# Patient Record
Sex: MALE | Race: WHITE | Employment: FULL TIME | URBAN - METROPOLITAN AREA
[De-identification: names, ages, dates, MRNs, and addresses within clinical notes are randomized per-mention and may not be internally consistent; named-entity substitution may affect disease eponyms.]

---

## 2021-12-03 ENCOUNTER — HOSPITAL ENCOUNTER (OUTPATIENT)
Dept: PHYSICAL THERAPY | Age: 51
Discharge: HOME | End: 2021-12-03
Payer: COMMERCIAL

## 2021-12-03 PROCEDURE — 97110 THERAPEUTIC EXERCISES: CPT

## 2021-12-03 PROCEDURE — 97161 PT EVAL LOW COMPLEX 20 MIN: CPT

## 2021-12-03 NOTE — PROGRESS NOTES
[facility] PHYSICAL THERAPY AT [facility] [address] [place], [address] - Phone: [phone]  Fax: 03-03-10-81 / [address]  Patient Name: Reinaldo Witt : 1970   Medical   Diagnosis: Pain in left shoulder [M25.512] Treatment Diagnosis: L shoulder pain   Onset Date: 21     Referral Source: Monica Yang MD Start of Care [facility]): 12/3/2021   Prior Hospitalization: See medical history Provider #: [id number]   Prior Level of Function: Full use of L UE for ADLs/ recreational activity   Comorbidities: Arthritis, depression, asthma, LBP, BMI> 30, sleep dysfunction   Medications: Verified on Patient Summary List   The Plan of Care and following information is based on the information from the initial evaluation.   ================================================================  Assessment / johnson information: Reinaldo Witt is a 46 y.o.  yo male with Dx of Pain in left shoulder [M25.512]. He reports injuring the left shoulder when he fell on it 21. He initially saw his PCP, then had f/u at [facility]. He has been diagnosed with L rotator cuff tear. Observation: forward head and shoulders  ROM measures: L shoulder AROM: flex= 51, scap= 67, ER= 71, IR= 80 (all movements are painful),  PROM WFLs  MMT measures: flex, scap, ER = 2 to 2+/5, IR= WNL  Special tests: na  FOTO score= 38%.  ================================================================  Eval Complexity: History LOW Complexity : Zero comorbidities / personal factors that will impact the outcome / POC;  Examination  MEDIUM Complexity : 3 Standardized tests and measures addressing body structure, function, activity limitation and / or participation in recreation ; Presentation MEDIUM Complexity : Evolving with changing characteristics ;   Decision Making MEDIUM Complexity : FOTO score of 26-74; Overall Complexity LOW   Problem List: pain affecting function, decrease ROM, decrease strength, decrease ADL/ functional abilitiies and decrease activity tolerance   Treatment Plan may include any combination of the following: Therapeutic exercise, Therapeutic activities, Neuromuscular re-education, Physical agent/modality, Manual therapy and Patient education  Patient / Family readiness to learn indicated by: asking questions, trying to perform skills and interest  Persons(s) to be included in education: patient (P)  Barriers to Learning/Limitations: None  Measures taken, if barriers to learning:    Patient Goal (s): Avoid surgery   Patient self reported health status: poor  Rehabilitation Potential: fair     Short Term Goals: To be accomplished in  2  weeks:  1 Patient will report >= 25% improvement in symptoms with ADLs  2 Patient will be educated in appropriate ROM, stabilization, strengthening exercises. 3 Maintain full PROM to avoid further complication      Long Term Goals: To be accomplished in  4-6  weeks:  1 Patient to report >= 75% improvement in symptoms with ADLs. 2 Patient will be independent with finalized HEP/ self maintenance. 3 Increase FOTO score >= 67% to indicate improved function with use of L UE.  4 Restore full AROM for improved ADL participation. Frequency / Duration:   Patient to be seen  2  times per week for 4-6  weeks:  Patient / Caregiver education and instruction: self care, activity modification and exercises    Therapist Signature: Lorelei Malhotra PT Date: 78/2/8847   Certification Period:  Time: 9:26 AM   ===================================================================  I certify that the above Physical Therapy Services are being furnished while the patient is under my care. I agree with the treatment plan and certify that this therapy is necessary.     Physician Signature:        Date:       Time:        Nisha Alexandre MD  Please sign and return to [facility] [place] or you may fax the signed copy to [phone]. Thank you.

## 2021-12-03 NOTE — PROGRESS NOTES
PHYSICAL THERAPY - DAILY TREATMENT NOTE     Patient Name: Satnma Jones        Date: 12/3/2021  : 1970   YES Patient  Verified  Visit #:   1     Insurance: Payor: Nicolas Acosta / Plan: Chad CHARLES / Product Type: Commerical /      In time: 845 Out time: 653   Total Treatment Time: 50     Medicare/[facility] Time Tracking (below)   Total Timed Codes (min):   1:1 Treatment Time:       TREATMENT AREA =  Pain in left shoulder [M25.512]    SUBJECTIVE    Pain Level (on 0 to 10 scale):  -6  / 10   Medication Changes/New allergies or changes in medical history, any new surgeries or procedures?     NO    If yes, update Summary List   Subjective Functional Status/Changes:  []  No changes reported     See eval /POC         OBJECTIVE  Modalities Rationale:     decrease pain to improve patient's ability to return to PLOF      min [] Estim, type/location:                                      []  att     []  unatt     []  w/US     []  w/ice    []  w/heat    min []  Mechanical Traction: type/lbs                   []  pro   []  sup   []  int   []  cont    []  before manual    []  after manual    min []  Ultrasound, settings/location:      min []  Iontophoresis w/ dexamethasone, location:                                               []  take home patch       []  in clinic   10 min [x]  Ice     []  Heat    location/position:     min []  Vasopneumatic Device, press/temp:     min []  Other:    [x] Skin assessment post-treatment (if applicable):    [x]  intact    []  redness- no adverse reaction     []redness - adverse reaction:      15 min Therapeutic Exercise:  [x]  See flow sheet   Rationale:      increase ROM and increase strength to improve the patients ability to return to PLOF      min Manual Therapy: Technique:      [] S/DTM []IASTM []PROM [] Passive Stretching   []manual TPR    []Jt manipulation:Gr I [] II []  III [] IV[]  []REIL with manual OP  Treatment Area:     Rationale:      decrease pain, increase ROM, increase tissue extensibility and decrease trigger points to improve patient's ability to return to PLOF     min Neuromuscular Re-ed: [x]  See flow sheet   Rationale:      improve coordination, improve balance, increase proprioception and dec dizziness to improve the patients ability to return to PLOF        min Self Care:    Rationale:    increase ROM, increase strength and improve coordination to improve the patients ability to return to PLOF    Billed With/As:   [] TE   [] TA   [] Neuro   [] Self Care Patient Education: [x] Review HEP    [] Progressed/Changed HEP based on:   [] positioning   [] body mechanics   [] transfers   [] heat/ice application    [] other:        Other Objective/Functional Measures:    See eval/ POC     Post Treatment Pain Level (on 0 to 10) scale:   5-6  / 10     ASSESSMENT    X  See POC     PLAN    [x]  Upgrade activities as tolerated {YES) Continue plan of care   []  Discharge due to :    []  Other:      Therapist: Davie Simon PT    Date: 12/3/2021 Time: 9:25 AM   No future appointments.

## 2021-12-06 ENCOUNTER — APPOINTMENT (OUTPATIENT)
Dept: PHYSICAL THERAPY | Age: 51
End: 2021-12-06
Payer: COMMERCIAL

## 2021-12-06 ENCOUNTER — HOSPITAL ENCOUNTER (OUTPATIENT)
Dept: PHYSICAL THERAPY | Age: 51
Discharge: HOME | End: 2021-12-06
Payer: COMMERCIAL

## 2021-12-06 PROCEDURE — 97014 ELECTRIC STIMULATION THERAPY: CPT

## 2021-12-06 PROCEDURE — 97140 MANUAL THERAPY 1/> REGIONS: CPT

## 2021-12-06 PROCEDURE — 97110 THERAPEUTIC EXERCISES: CPT

## 2021-12-06 NOTE — PROGRESS NOTES
PHYSICAL THERAPY - DAILY TREATMENT NOTE     Patient Name: Teddy Hernandez        Date: 2021  : 1970   YES Patient  Verified  Visit #:  2   of     Insurance: Payor: Kitty Jorge / Plan: Ronaldo CHARLES / Product Type: Commerical /      In time: 9:00 am Out time: 9:49   Total Treatment Time: 49     Medicare/[facility] Time Tracking (below)   Total Timed Codes (min):  29 1:1 Treatment Time: 29     TREATMENT AREA =  Pain in left shoulder [M25.512]    SUBJECTIVE    Pain Level (on 0 to 10 scale): 7-8  / 10   Medication Changes/New allergies or changes in medical history, any new surgeries or procedures?     NO    If yes, update Summary List   Subjective Functional Status/Changes:  []  No changes reported       Functional improvements: -  Functional impairments: increased pain at night         OBJECTIVE  Modalities Rationale:     decrease edema, decrease inflammation, decrease pain and increase tissue extensibility to improve patient's ability to perform ADLs without pain  10 min [x] Estim, type/location: IFC left shoulder; semi reclined                                     []  att     [x]  unatt     []  w/US     [x]  w/ice    []  w/heat    min []  Mechanical Traction: type/lbs                   []  pro   []  sup   []  int   []  cont    []  before manual    []  after manual    min []  Ultrasound, settings/location:      min []  Iontophoresis w/ dexamethasone, location:                                               []  take home patch       []  in clinic   10 min []  Ice     [x]  Heat    location/position: MH-pre manual left shoulder    min []  Vasopneumatic Device, press/temp:    If using vaso (only need to measure limb vaso being performed on)      pre-treatment girth :       post-treatment girth :       measured at (landmark location) :      min []  Other:    [x] Skin assessment post-treatment (if applicable):    [x]  intact    []  redness- no adverse reaction                  []redness - adverse reaction:      14 min Therapeutic Exercise:  [x]  See flow sheet   Rationale:      increase ROM and increase strength to improve the patients ability to perform ADLs without pain         15 min Manual Therapy: Technique:      [] S/DTM []IASTM []PROM [] Passive Stretching   []manual TPR    []Jt manipulation:Gr I [] II []  III [] IV[] V[]  Treatment Area:     Rationale:      decrease pain, increase ROM and increase tissue extensibility to improve patient's ability to perform self care  The manual therapy interventions were performed at a separate and distinct time from the therapeutic activities interventions. Billed With/As:   [] TE   [] TA   [] Neuro   [] Self Care Patient Education: [x] Review HEP    [] Progressed/Changed HEP based on:   [] positioning   [] body mechanics   [] transfers   [] heat/ice application    [] other:        Other Objective/Functional Measures: Add IFC with ice to decrease pain and muscle tension  Modified HEP   Post Treatment Pain Level (on 0 to 10) scale:   5-6 / 10     ASSESSMENT    Assessment/Changes in Function:   VCs for proper exercise technique, pt education to perform exercises in tolerable ranges; sleeping positioning     []  See Progress Note/Recertification   Patient will continue to benefit from skilled PT services to modify and progress therapeutic interventions, address functional mobility deficits, address ROM deficits, address strength deficits, analyze and address soft tissue restrictions, analyze and cue movement patterns, analyze and modify body mechanics/ergonomics and instruct in home and community integration to attain remaining goals.       Progress toward goals / Updated goals:    Slow Progress to    [] STG    [] LTG  1 as shown by fair tolerance to HEP, increased pain levels at night       PLAN    [x]  Upgrade activities as tolerated YES Continue plan of care   []  Discharge due to :    []  Other:      Therapist: Megan Anthony PTA    Date: 12/6/2021 Time: 9:49 am     Future Appointments   Date Time Provider Glenroy Rey   12/9/2021  5:15 PM Indira Grijalva PTA [facility]   12/13/2021  5:00 PM Mellissa Pimentel, KATHRINE [facility]   12/16/2021  5:15 PM Indira Grijalva PTA [facility]

## 2021-12-09 ENCOUNTER — HOSPITAL ENCOUNTER (OUTPATIENT)
Dept: PHYSICAL THERAPY | Age: 51
Discharge: HOME | End: 2021-12-09
Payer: COMMERCIAL

## 2021-12-09 PROCEDURE — 97014 ELECTRIC STIMULATION THERAPY: CPT

## 2021-12-09 PROCEDURE — 97110 THERAPEUTIC EXERCISES: CPT

## 2021-12-09 PROCEDURE — 97140 MANUAL THERAPY 1/> REGIONS: CPT

## 2021-12-09 NOTE — PROGRESS NOTES
PHYSICAL THERAPY - DAILY TREATMENT NOTE     Patient Name: Jerica Gaines        Date: 2021  : 1970   YES Patient  Verified  Visit #: 3   of     Insurance: Payor: Briana Devine / Plan: Gail CHARLES / Product Type: Commerical /      In time: 4:59 pm Out time: 5:51   Total Treatment Time: 52     Medicare/[employer] Time Tracking (below)   Total Timed Codes (min):  - 1:1 Treatment Time: -     TREATMENT AREA =  Pain in left shoulder [M25.512]    SUBJECTIVE    Pain Level (on 0 to 10 scale): -6  / 10   Medication Changes/New allergies or changes in medical history, any new surgeries or procedures?     Yes     If yes, update Summary List   Subjective Functional Status/Changes:  []  No changes reported       Functional improvements: performing HEP daily  Functional impairments: constant dull ache,  increased pain at night         OBJECTIVE  Modalities Rationale:     decrease edema, decrease inflammation, decrease pain and increase tissue extensibility to improve patient's ability to perform ADLs without pain  10 min [x] Estim, type/location: IFC left shoulder; semi reclined                                     []  att     [x]  unatt     []  w/US     [x]  w/ice    []  w/heat    min []  Mechanical Traction: type/lbs                   []  pro   []  sup   []  int   []  cont    []  before manual    []  after manual    min []  Ultrasound, settings/location:      min []  Iontophoresis w/ dexamethasone, location:                                               []  take home patch       []  in clinic    min []  Ice     []  Heat    location/position:     min []  Vasopneumatic Device, press/temp:    If using vaso (only need to measure limb vaso being performed on)      pre-treatment girth :       post-treatment girth :       measured at (landmark location) :      min []  Other:    [x] Skin assessment post-treatment (if applicable):    [x]  intact    []  redness- no adverse reaction                  []redness - adverse reaction: 37 min Therapeutic Exercise:  [x]  See flow sheet   Rationale:      increase ROM and increase strength to improve the patients ability to perform ADLs without pain         15 min Manual Therapy: Technique:      [] S/DTM []IASTM []PROM [] Passive Stretching   []manual TPR    []Jt manipulation:Gr I [] II []  III [] IV[] V[]  Treatment Area:     Rationale:      decrease pain, increase ROM and increase tissue extensibility to improve patient's ability to perform self care  The manual therapy interventions were performed at a separate and distinct time from the therapeutic activities interventions. Billed With/As:   [] TE   [] TA   [] Neuro   [] Self Care Patient Education: [x] Review HEP    [] Progressed/Changed HEP based on:   [] positioning   [] body mechanics   [] transfers   [] heat/ice application    [] other:        Other Objective/Functional Measures:  Self AAROM: flex: 50 degrees; ER :35 degrees;    PROM flexion: ~ 75 degrees    Updated written HEP   Post Treatment Pain Level (on 0 to 10) scale:  4/ 10     ASSESSMENT    Assessment/Changes in Function:   Pt demonstrating improving self AAROM into flexion and with table stretches      []  See Progress Note/Recertification   Patient will continue to benefit from skilled PT services to modify and progress therapeutic interventions, address functional mobility deficits, address ROM deficits, address strength deficits, analyze and address soft tissue restrictions, analyze and cue movement patterns, analyze and modify body mechanics/ergonomics and instruct in home and community integration to attain remaining goals.       Progress toward goals / Updated goals:    Slow Progress to    [] STG    [] LTG  1 as shown by fair/good  tolerance to HEP, decreasing muscle guarding in PROM, increased pain levels at night       PLAN    [x]  Upgrade activities as tolerated YES Continue plan of care   []  Discharge due to :    []  Other:      Therapist: Madina Miller, PTA Date: 12/9/2021 Time: 5:51 pm     Future Appointments   Date Time Provider Glenroy Rey   12/9/2021  5:15 PM Siri Jones PTA [facility]   12/13/2021  5:00 PM Geovanni Mallory PT [facility]   12/16/2021  5:15 PM Siri Jones PTA [facility]

## 2021-12-13 ENCOUNTER — HOSPITAL ENCOUNTER (OUTPATIENT)
Dept: PHYSICAL THERAPY | Age: 51
Discharge: HOME | End: 2021-12-13
Payer: COMMERCIAL

## 2021-12-13 PROCEDURE — 97110 THERAPEUTIC EXERCISES: CPT

## 2021-12-13 PROCEDURE — 97140 MANUAL THERAPY 1/> REGIONS: CPT

## 2021-12-13 NOTE — PROGRESS NOTES
PHYSICAL THERAPY - DAILY TREATMENT NOTE     Patient Name: Arabella Diaz        Date: 2021  : 1970   YES Patient  Verified  Visit #:   4     Insurance: Payor: Garett Kothari / Plan: Angie CHARLES / Product Type: Commerical /      In time: 500 Out time: 550   Total Treatment Time: 50     Medicare/[facility] Time Tracking (below)   Total Timed Codes (min):   1:1 Treatment Time:       TREATMENT AREA =  Pain in left shoulder [M25.512]    SUBJECTIVE    Pain Level (on 0 to 10 scale):  -6  / 10   Medication Changes/New allergies or changes in medical history, any new surgeries or procedures?     NO    If yes, update Summary List   Subjective Functional Status/Changes:  []  No changes reported     Can't sleep at night, it's so painful         OBJECTIVE  Modalities Rationale:     decrease inflammation and decrease pain to improve patient's ability to perform ADLs with less pain      min [] Estim, type/location:                                      []  att     []  unatt     []  w/US     []  w/ice    []  w/heat    min []  Mechanical Traction: type/lbs                   []  pro   []  sup   []  int   []  cont    []  before manual    []  after manual    min []  Ultrasound, settings/location:      min []  Iontophoresis w/ dexamethasone, location:                                               []  take home patch       []  in clinic   10 min [x]  Ice     []  Heat    location/position:     min []  Vasopneumatic Device, press/temp:              cold / med If using vaso       pre-treatment girth :       post-treatment girth :       measured at (location) :     min []  Other:    [x] Skin assessment post-treatment (if applicable):    [x]  intact    [x]  redness- no adverse reaction     []redness - adverse reaction:      10 min Manual Therapy: Technique:      [x] S/DTM []IASTM []PROM [x] Passive Stretching   [x]manual TPR  [] SOR [] man traction  []Jt manipulation:Gr I [] II []  III [] IV[]   [] OP with REIL    []   Treatment Area:  L shoulder region      *manual therapy interventions were performed at a separate and distinct time from   the therapeutic activities interventions. Rationale:      decrease pain, increase ROM, increase tissue extensibility and decrease trigger points to improve patient's ability to maintain joint mobility for ADLs    30 min Therapeutic Exercise:  [x]  See flow sheet   Rationale:      increase ROM and increase strength to improve the patients ability to maintain joint mobility/ strength for ADLs       Billed With/As:   [x] TE   [] TA   [] Neuro   [] Self Care Patient Education: [x] Review HEP    [] Progressed/Changed HEP based on:   [] positioning   [] body mechanics   [] transfers   [] heat/ice application    [] other:        Other Objective/Functional Measures:    Near full PROM, but painful    Added wall walks, scap stability ex to HEP   Post Treatment Pain Level (on 0 to 10) scale:   5  / 10     ASSESSMENT    Assessment/Changes in Function:     Continued weakness, but improved tolerance for AAROM     []  See Progress Note/Recertification   Patient will continue to benefit from skilled PT services to modify and progress therapeutic interventions, address functional mobility deficits, address ROM deficits, address strength deficits and analyze and address soft tissue restrictions to attain remaining goals.       Progress toward goals / Updated goals:    Fair Progress to    [] STG    [x] LTG  4 as shown by progression of AAROM       []  See Progress Note/Recertification    PLAN    [x]  Upgrade activities as tolerated {YES) Continue plan of care   []  Discharge due to :    []  Other:      Therapist: Tashia Wade PT    Date: 12/13/2021 Time: 4:58 PM     Future Appointments   Date Time Provider Glenroy Rey   12/13/2021  5:00 PM Francisco Fernandez PT [facility]   12/16/2021  5:15 PM Dulce Soto PTA [facility]

## 2021-12-16 ENCOUNTER — HOSPITAL ENCOUNTER (OUTPATIENT)
Dept: PHYSICAL THERAPY | Age: 51
Discharge: HOME | End: 2021-12-16
Payer: COMMERCIAL

## 2021-12-16 PROCEDURE — 97014 ELECTRIC STIMULATION THERAPY: CPT

## 2021-12-16 PROCEDURE — 97110 THERAPEUTIC EXERCISES: CPT

## 2021-12-16 PROCEDURE — 97140 MANUAL THERAPY 1/> REGIONS: CPT

## 2021-12-16 NOTE — PROGRESS NOTES
[facility] PHYSICAL THERAPY AT [address] - Phone: [phone] Fax: [phone]  PROGRESS NOTE  Patient Name: Ben Thorpe : 1970   Treatment/Medical Diagnosis: Pain in left shoulder [M25.512]   Referral Source: Tanvi Baer MD     Date of Initial Visit: 12-3-21 Attended Visits: 5 Missed Visits: 0     SUMMARY OF TREATMENT  Physical Therapy treatment has consisted of Therapeutic exercise for AAROM, AROM exercise, Manual Therapy, pt education in HEP, Interferential electrical stimulation, and ice. CURRENT STATUS  Patient has made slow steady in Physical Therapy, consistently demonstrating improving  tolerance to exercise, improving passive and active range of motion, and improving functional mobility. Functional impairments:  pain affecting sleeping, reaching a shelf that is at shoulder height, putting on deodorant under the arm opposite of the affected shoulder; a little difficulty turning a faucet in the same direction as the affected arm, flushing the toilet, lifting light items such as a blanket. Pt's current pain range is 4 to 10/10. Pt reporting intermittent radiating pain into left fore arm. Functional improvements are improving AROM, dressing and bathing  FOTO:  48   AROM: Flex: 108-pain descending  ABD: 78- pain ascending  ER:60   IR: FIR: to waist  MMT: flex/ABD/ER: ~2+/5  Pt would benefit from continued PT intervention in order to improve left shoulder  strength, flexibility, Functional mobility, and address remaining impairments. Goal/Measure of Progress Goal Met? 1. Patient will report >= 25% improvement in symptoms with ADLs   Status at last Eval: n/a Current Status: Pt reports ~ 40% overall improvement yes   2. Patient will be educated in appropriate ROM, stabilization, strengthening exercises. Status at last Eval: n/a Current Status: Pt instructed in initial HEP yes   3. Maintain full PROM to avoid further complication   Status at last Eval: PROM WFLs Current Status: PROM: flexion: 130  ABD: 95  IR: 72  ER: 42 Partially met     New Goals to be achieved in __4_  weeks:  1 Patient to report >= 75% improvement in symptoms with ADLs. 2 Patient will be independent with finalized HEP/ self maintenance. 3 Increase FOTO score >= 67% to indicate improved function with use of L UE.  4 Restore full AROM for improved ADL participation. RECOMMENDATIONS  Plan to continue 2x per week x 4 weeks   If you have any questions/comments please contact us directly at  [phone]  Thank you for allowing us to assist in the care of your patient. LPTA Signature: Anahi Wood PTA  Date: 12/16/2021   PT Signature:  Time: 4:52 PM   NOTE TO PHYSICIAN:  PLEASE COMPLETE THE ORDERS BELOW AND FAX TO   [facility] Physical Therapy: [phone]  If you are unable to process this request in 24 hours please contact our office:  [phone].    ___ I have read the above report and request that my patient continue as recommended.   ___ I have read the above report and request that my patient continue therapy with the following changes/special instructions:_________________________________________________________   ___ I have read the above report and request that my patient be discharged from therapy.      Physician Signature:        Date:       Time:

## 2021-12-16 NOTE — PROGRESS NOTES
PHYSICAL THERAPY - DAILY TREATMENT NOTE     Patient Name: Ben Thorpe        Date: 2021  : 1970   YES Patient  Verified  Visit #:   5   of     Insurance: Payor: [employer] / Plan: Ishan CHARLES / Product Type: Commerical /      In time: 4:57  Out time: 5: 43    Total Treatment Time: 46     Medicare/[facility] Time Tracking (below)   Total Timed Codes (min):- 1:1 Treatment Time:  -     TREATMENT AREA =  Pain in left shoulder [M25.512]    SUBJECTIVE    Pain Level (on 0 to 10 scale):  7 / 10   Medication Changes/New allergies or changes in medical history, any new surgeries or procedures?     NO    If yes, update Summary List   Subjective Functional Status/Changes:  []  No changes reported     Pt reports increased pain levels 1-2 days after last session         OBJECTIVE  Modalities Rationale:     decrease edema, decrease inflammation, decrease pain and increase tissue extensibility to improve patient's ability to perform self care  10 min [x] Estim, type/location: IFC left shoulder                                     []  att     [x]  unatt     []  w/US     [x]  w/ice    []  w/heat    min []  Mechanical Traction: type/lbs                   []  pro   []  sup   []  int   []  cont    []  before manual    []  after manual    min []  Ultrasound, settings/location:      min []  Iontophoresis w/ dexamethasone, location:                                               []  take home patch       []  in clinic    min []  Ice     []  Heat    location/position:     min []  Vasopneumatic Device, press/temp:    If using vaso (only need to measure limb vaso being performed on)      pre-treatment girth :       post-treatment girth :       measured at (landmark location) :      min []  Other:    [x] Skin assessment post-treatment (if applicable):    [x]  intact    []  redness- no adverse reaction                  []redness - adverse reaction:      26 min Therapeutic Exercise:  [x]  See flow sheet   Rationale:      increase ROM and increase strength to improve the patients ability to perform self care         10 min Manual Therapy: Technique:      [] S/DTM []IASTM []PROM [] Passive Stretching   []manual TPR    []Jt manipulation:Gr I [] II []  III [] IV[] V[]  Treatment Area:     Rationale:      decrease pain, increase ROM and increase tissue extensibility to improve patient's ability to perform self care  The manual therapy interventions were performed at a separate and distinct time from the therapeutic activities interventions. Billed With/As:   [] TE   [] TA   [] Neuro   [] Self Care Patient Education: [x] Review HEP    [] Progressed/Changed HEP based on:   [] positioning   [] body mechanics   [] transfers   [] heat/ice application    [] other:        Other Objective/Functional Measures: FOTO: 48   Post Treatment Pain Level (on 0 to 10) scale:  5-6 / 10     ASSESSMENT    Assessment/Changes in Function: E-NMR completed   See progress note     [x]  See Progress Note/Recertification   Patient will continue to benefit from skilled PT services to modify and progress therapeutic interventions, address functional mobility deficits, address ROM deficits, address strength deficits, analyze and address soft tissue restrictions and instruct in home and community integration to attain remaining goals.       Progress toward goals / Updated goals:    See progress note       PLAN    [x]  Upgrade activities as tolerated YES Continue plan of care   []  Discharge due to :    [x]  Other: PT pending insurance authorization     Therapist: Aubrey San PTA    Date: 12/16/2021 Time: 5:43  PM     Future Appointments   Date Time Provider Glenroy Rey   12/16/2021  5:15 PM Omer Dandy, PTA [facility]

## 2022-02-24 NOTE — PROGRESS NOTES
[address] [facility] PHYSICAL THERAPY AT [facility] [address] [place], [address]  Phone: [phone]  Fax: [phone] SUMMARY  Patient Name: Karen Freitas : 1970   Treatment/Medical Diagnosis: Pain in left shoulder [M25.512]   Referral Source: Nisha Alexandre MD     Date of Initial Visit: 12-3-21 Attended Visits: 5 Missed Visits: 0     SUMMARY OF TREATMENT  Physical Therapy treatment has consisted of Therapeutic exercise for AAROM, AROM exercise, Manual Therapy, pt education in HEP, Interferential electrical stimulation, and ice. CURRENT STATUS  Patient was unable to be formally reassessed secondary to not returning to PT after 21 visit. Goal/Measure of Progress Goal Met? 1.   Patient to report >= 75% improvement in symptoms with ADLs.     Status at last Eval: 40% improvement Current Status: Unable to formally reassess. no   2. Patient will be independent with finalized HEP/ self maintenance. Status at last Eval: Pt instructed initial HEP. Current Status: Unable to formally reassess. no   3. Increase FOTO score >= 67% to indicate improved function with use of L UE. Status at last Eval: 48 Current Status: Unable to formally reassess. no   4. Restore full AROM for improved ADL participation. Status at last Eval: AROM: Flex: 108-pain descending  ABD: 78- pain ascending  ER:60   IR: FIR: to waist Current Status: Unable to formally reassess. no     RECOMMENDATIONS  Discontinue therapy due to lack of attendance or compliance. If you have any questions/comments please contact us directly at [phone]. Thank you for allowing us to assist in the care of your patient.     Therapist Signature: Linda Garcia PTA Date: 21   Reporting Period:   n/a Time: 1:27 PM

## 2022-08-15 ENCOUNTER — APPOINTMENT (OUTPATIENT)
Dept: PHYSICAL THERAPY | Age: 52
End: 2022-08-15

## 2022-08-24 ENCOUNTER — APPOINTMENT (OUTPATIENT)
Dept: PHYSICAL THERAPY | Age: 52
End: 2022-08-24